# Patient Record
(demographics unavailable — no encounter records)

---

## 2025-05-30 NOTE — PLAN
[FreeTextEntry1] : unimplanted pregnancy got her menses today  urine preg neg in office  will send beta to confirm  pt reassured  follwo upprn anual

## 2025-05-30 NOTE — PROCEDURE
[Amenorrhea] : Amenorrhea [Transvaginal Ultrasound] : transvaginal ultrasound [FreeTextEntry3] : normal uterus , thin lining , normal ovaries b/l

## 2025-05-30 NOTE — PHYSICAL EXAM
[MA] : MA [FreeTextEntry2] : Myah [Appropriately responsive] : appropriately responsive [Alert] : alert [No Acute Distress] : no acute distress [Soft] : soft [Non-tender] : non-tender [Non-distended] : non-distended [No HSM] : No HSM [No Lesions] : no lesions [No Mass] : no mass [Oriented x3] : oriented x3 [Labia Majora] : normal [Labia Minora] : normal [Normal] : normal [Uterine Adnexae] : normal

## 2025-05-30 NOTE — HISTORY OF PRESENT ILLNESS
[FreeTextEntry1] : pt come sin for evaluation of a weakly positive preg test yetrday  lmp 04/16/2025 no bleeidng since  started bleeding thes am on the day of papt  no med  sno sob, no cp, full rom, no dysuria  reg cycles no menomt  urine dip neg for preg in office  urine dip neg for puti